# Patient Record
Sex: MALE | Race: WHITE
[De-identification: names, ages, dates, MRNs, and addresses within clinical notes are randomized per-mention and may not be internally consistent; named-entity substitution may affect disease eponyms.]

---

## 2017-11-22 ENCOUNTER — HOSPITAL ENCOUNTER (OUTPATIENT)
Dept: HOSPITAL 47 - LABWHC1 | Age: 52
Discharge: HOME | End: 2017-11-22
Payer: COMMERCIAL

## 2017-11-22 DIAGNOSIS — H93.19: ICD-10-CM

## 2017-11-22 DIAGNOSIS — M79.1: Primary | ICD-10-CM

## 2017-11-22 DIAGNOSIS — R53.83: ICD-10-CM

## 2017-11-22 DIAGNOSIS — A69.22: ICD-10-CM

## 2017-11-22 LAB
ALP SERPL-CCNC: 50 U/L (ref 38–126)
ALT SERPL-CCNC: 45 U/L (ref 21–72)
ANA W/REFLEX TO TITER: NEGATIVE
ANION GAP SERPL CALC-SCNC: 12 MMOL/L
AST SERPL-CCNC: 26 U/L (ref 17–59)
BASOPHILS # BLD AUTO: 0.1 K/UL (ref 0–0.2)
BASOPHILS NFR BLD AUTO: 1 %
BUN SERPL-SCNC: 17 MG/DL (ref 9–20)
CALCIUM SPEC-MCNC: 9.8 MG/DL (ref 8.4–10.2)
CH: 30.7
CHCM: 33.3
CHLORIDE SERPL-SCNC: 103 MMOL/L (ref 98–107)
CO2 SERPL-SCNC: 26 MMOL/L (ref 22–30)
EOSINOPHIL # BLD AUTO: 0.3 K/UL (ref 0–0.7)
EOSINOPHIL NFR BLD AUTO: 4 %
ERYTHROCYTE [DISTWIDTH] IN BLOOD BY AUTOMATED COUNT: 5.21 M/UL (ref 4.3–5.9)
ERYTHROCYTE [DISTWIDTH] IN BLOOD: 13.8 % (ref 11.5–15.5)
GLUCOSE SERPL-MCNC: 107 MG/DL (ref 74–99)
HCT VFR BLD AUTO: 48.2 % (ref 39–53)
HDW: 2.37
HGB BLD-MCNC: 15.5 GM/DL (ref 13–17.5)
LUC NFR BLD AUTO: 2 %
LYMPHOCYTES # SPEC AUTO: 1.9 K/UL (ref 1–4.8)
LYMPHOCYTES NFR SPEC AUTO: 26 %
MAGNESIUM SPEC-SCNC: 1.9 MG/DL (ref 1.6–2.3)
MCH RBC QN AUTO: 29.8 PG (ref 25–35)
MCHC RBC AUTO-ENTMCNC: 32.2 G/DL (ref 31–37)
MCV RBC AUTO: 92.6 FL (ref 80–100)
MONOCYTES # BLD AUTO: 0.3 K/UL (ref 0–1)
MONOCYTES NFR BLD AUTO: 5 %
NEUTROPHILS # BLD AUTO: 4.5 K/UL (ref 1.3–7.7)
NEUTROPHILS NFR BLD AUTO: 63 %
NON-AFRICAN AMERICAN GFR(MDRD): >60
POTASSIUM SERPL-SCNC: 4.2 MMOL/L (ref 3.5–5.1)
PROT SERPL-MCNC: 7.6 G/DL (ref 6.3–8.2)
RHEUMATOID FACT SERPL-ACNC: <9 IU/ML (ref ?–12)
SODIUM SERPL-SCNC: 141 MMOL/L (ref 137–145)
URATE SERPL-MCNC: 5.4 MG/DL (ref 3.5–8.5)
WBC # BLD AUTO: 0.11 10*3/UL
WBC # BLD AUTO: 7.2 K/UL (ref 3.8–10.6)
WBC (PEROX): 6.72

## 2017-11-22 PROCEDURE — 86812 HLA TYPING A B OR C: CPT

## 2017-11-22 PROCEDURE — 83735 ASSAY OF MAGNESIUM: CPT

## 2017-11-22 PROCEDURE — 36415 COLL VENOUS BLD VENIPUNCTURE: CPT

## 2017-11-22 PROCEDURE — 87338 HPYLORI STOOL AG IA: CPT

## 2017-11-22 PROCEDURE — 84630 ASSAY OF ZINC: CPT

## 2017-11-22 PROCEDURE — 80053 COMPREHEN METABOLIC PANEL: CPT

## 2017-11-22 PROCEDURE — 86060 ANTISTREPTOLYSIN O TITER: CPT

## 2017-11-22 PROCEDURE — 83036 HEMOGLOBIN GLYCOSYLATED A1C: CPT

## 2017-11-22 PROCEDURE — 86738 MYCOPLASMA ANTIBODY: CPT

## 2017-11-22 PROCEDURE — 86618 LYME DISEASE ANTIBODY: CPT

## 2017-11-22 PROCEDURE — 86225 DNA ANTIBODY NATIVE: CPT

## 2017-11-22 PROCEDURE — 85652 RBC SED RATE AUTOMATED: CPT

## 2017-11-22 PROCEDURE — 84550 ASSAY OF BLOOD/URIC ACID: CPT

## 2017-11-22 PROCEDURE — 86003 ALLG SPEC IGE CRUDE XTRC EA: CPT

## 2017-11-22 PROCEDURE — 85025 COMPLETE CBC W/AUTO DIFF WBC: CPT

## 2017-11-22 PROCEDURE — 86431 RHEUMATOID FACTOR QUANT: CPT

## 2017-11-22 PROCEDURE — 82525 ASSAY OF COPPER: CPT

## 2017-11-22 PROCEDURE — 86038 ANTINUCLEAR ANTIBODIES: CPT

## 2017-11-23 LAB
B BURGDOR IGG SERPL QL IA: 0.1 INDEX
HLA B27 COMMENT: (no result)
LYME IGG/IGM INTERP: NEGATIVE

## 2017-11-24 LAB
C ALBICANS IGE RAST: (no result)
GLUTEN IGE RAST: (no result)
M PNEUMO IGG SER IA-ACNC: 1.6 INDEX (ref ?–0.9)
M PNEUMO IGM SER QL IA: 0.18 INDEX (ref ?–0.9)

## 2018-01-21 ENCOUNTER — HOSPITAL ENCOUNTER (EMERGENCY)
Dept: HOSPITAL 47 - EC | Age: 53
Discharge: LEFT BEFORE DISCHARGE | End: 2018-01-21
Payer: COMMERCIAL

## 2018-01-21 VITALS — TEMPERATURE: 97.4 F | RESPIRATION RATE: 18 BRPM

## 2018-01-21 VITALS — SYSTOLIC BLOOD PRESSURE: 128 MMHG | DIASTOLIC BLOOD PRESSURE: 77 MMHG | HEART RATE: 61 BPM

## 2018-01-21 DIAGNOSIS — Z88.8: ICD-10-CM

## 2018-01-21 DIAGNOSIS — Z79.899: ICD-10-CM

## 2018-01-21 DIAGNOSIS — I20.9: Primary | ICD-10-CM

## 2018-01-21 LAB
ALBUMIN SERPL-MCNC: 4.1 G/DL (ref 3.5–5)
ALP SERPL-CCNC: 47 U/L (ref 38–126)
ALT SERPL-CCNC: 54 U/L (ref 21–72)
ANION GAP SERPL CALC-SCNC: 11 MMOL/L
APTT BLD: 24 SEC (ref 22–30)
AST SERPL-CCNC: 32 U/L (ref 17–59)
BASOPHILS # BLD AUTO: 0.1 K/UL (ref 0–0.2)
BASOPHILS NFR BLD AUTO: 1 %
BUN SERPL-SCNC: 20 MG/DL (ref 9–20)
CALCIUM SPEC-MCNC: 9.5 MG/DL (ref 8.4–10.2)
CHLORIDE SERPL-SCNC: 105 MMOL/L (ref 98–107)
CK SERPL-CCNC: 94 U/L (ref 55–170)
CO2 SERPL-SCNC: 27 MMOL/L (ref 22–30)
D DIMER PPP FEU-MCNC: 0.22 MG/L FEU (ref ?–0.6)
EOSINOPHIL # BLD AUTO: 0.4 K/UL (ref 0–0.7)
EOSINOPHIL NFR BLD AUTO: 6 %
ERYTHROCYTE [DISTWIDTH] IN BLOOD BY AUTOMATED COUNT: 4.87 M/UL (ref 4.3–5.9)
ERYTHROCYTE [DISTWIDTH] IN BLOOD: 12.6 % (ref 11.5–15.5)
GLUCOSE SERPL-MCNC: 100 MG/DL (ref 74–99)
HCT VFR BLD AUTO: 44.1 % (ref 39–53)
HGB BLD-MCNC: 14.7 GM/DL (ref 13–17.5)
INR PPP: 1.1 (ref ?–1.2)
LYMPHOCYTES # SPEC AUTO: 2.4 K/UL (ref 1–4.8)
LYMPHOCYTES NFR SPEC AUTO: 35 %
MAGNESIUM SPEC-SCNC: 2.1 MG/DL (ref 1.6–2.3)
MCH RBC QN AUTO: 30.1 PG (ref 25–35)
MCHC RBC AUTO-ENTMCNC: 33.3 G/DL (ref 31–37)
MCV RBC AUTO: 90.5 FL (ref 80–100)
MONOCYTES # BLD AUTO: 0.5 K/UL (ref 0–1)
MONOCYTES NFR BLD AUTO: 7 %
NEUTROPHILS # BLD AUTO: 3.3 K/UL (ref 1.3–7.7)
NEUTROPHILS NFR BLD AUTO: 49 %
PLATELET # BLD AUTO: 256 K/UL (ref 150–450)
POTASSIUM SERPL-SCNC: 4 MMOL/L (ref 3.5–5.1)
PROT SERPL-MCNC: 7 G/DL (ref 6.3–8.2)
PT BLD: 10.4 SEC (ref 9–12)
SODIUM SERPL-SCNC: 143 MMOL/L (ref 137–145)
TROPONIN I SERPL-MCNC: <0.012 NG/ML (ref 0–0.03)
WBC # BLD AUTO: 6.8 K/UL (ref 3.8–10.6)

## 2018-01-21 PROCEDURE — 85730 THROMBOPLASTIN TIME PARTIAL: CPT

## 2018-01-21 PROCEDURE — 99285 EMERGENCY DEPT VISIT HI MDM: CPT

## 2018-01-21 PROCEDURE — 85610 PROTHROMBIN TIME: CPT

## 2018-01-21 PROCEDURE — 83735 ASSAY OF MAGNESIUM: CPT

## 2018-01-21 PROCEDURE — 80053 COMPREHEN METABOLIC PANEL: CPT

## 2018-01-21 PROCEDURE — 85025 COMPLETE CBC W/AUTO DIFF WBC: CPT

## 2018-01-21 PROCEDURE — 82553 CREATINE MB FRACTION: CPT

## 2018-01-21 PROCEDURE — 36415 COLL VENOUS BLD VENIPUNCTURE: CPT

## 2018-01-21 PROCEDURE — 82550 ASSAY OF CK (CPK): CPT

## 2018-01-21 PROCEDURE — 84484 ASSAY OF TROPONIN QUANT: CPT

## 2018-01-21 PROCEDURE — 85379 FIBRIN DEGRADATION QUANT: CPT

## 2018-01-21 PROCEDURE — 71046 X-RAY EXAM CHEST 2 VIEWS: CPT

## 2018-01-21 PROCEDURE — 83880 ASSAY OF NATRIURETIC PEPTIDE: CPT

## 2018-01-21 NOTE — ED
General Adult HPI





- General


Chief complaint: Chest Pain


Stated complaint: Chest/Shoulder Pain


Time Seen by Provider: 01/21/18 00:45


Source: patient, RN notes reviewed


Mode of arrival: ambulatory


Limitations: no limitations





- History of Present Illness


Initial comments: 





This is a 52-year-old male who presents emergency Department complaining of 

left shoulder and neck pain.  Patient states the pain radiates down his arm 

towards his elbow.  Patient states it's an ache that at times gets worse but at 

other times it feels better but it is always been there over the last few 

hours.  Patient denies any shortness of breath or diaphoresis.  Patient does 

state he is nauseated with this pain.  Patient states it does not feel muscular 

he has had quite a few problems with muscular pain in the past and even pinch 

nerves and this does not feel similar.  He denies any recent injury denies any 

heavy lifting.  Patient denies any recent fever chills or cough.  Patient 

denies any abdominal pain.  Patient states the pain is not worsened with any 

movement.  Patient also states he has a strong family history.  He denies any 

medical problems at all





- Related Data


 Home Medications











 Medication  Instructions  Recorded  Confirmed


 


Terbinafine [LamISIL] 250 mg PO DAILY 04/12/16 06/02/16


 


Amoxicillin/Potassium Clav 1 each PO QAM 05/26/16 06/02/16





[Augmentin 875-125 Tablet]   


 


Multivitamins, Thera [Multivitamin] 1 tab PO DAILY 05/26/16 06/02/16








 Previous Rx's











 Medication  Instructions  Recorded


 


Amoxicillin/Potassium Clav 1 each PO Q12HR #20 tab 06/02/16





[Augmentin 875-125 Tablet]  


 


Hydrocodone/Acetaminophen [Norco 1 - 2 each PO Q6HR PRN #40 tab 06/02/16





5-325]  











 Allergies











Allergy/AdvReac Type Severity Reaction Status Date / Time


 


steroids AdvReac  mood swings Uncoded 01/21/18 00:45














Review of Systems


ROS Statement: 


Those systems with pertinent positive or pertinent negative responses have been 

documented in the HPI.





ROS Other: All systems not noted in ROS Statement are negative.





Past Medical History


Past Medical History: No Reported History


Additional Past Medical History / Comment(s): sinus problems


History of Any Multi-Drug Resistant Organisms: None Reported


Additional Past Surgical History / Comment(s): vasectomy, sinus surgery


Past Anesthesia/Blood Transfusion Reactions: No Reported Reaction


Past Psychological History: No Psychological Hx Reported


Smoking Status: Never smoker





- Past Family History


  ** Mother


Family Medical History: No Reported History





General Exam





- General Exam Comments


Initial Comments: 





GENERAL:


Patient is well-developed and well-nourished.  Patient is nontoxic and well-

hydrated and is in mild distress.





ENT:


Neck is soft and supple.  No significant lymphadenopathy is noted.  Oropharynx 

is clear.  Moist mucous membranes.  Neck has full range of motion without 

eliciting any pain.  





EYES:


The sclera were anicteric and conjunctiva were pink and moist.  Extraocular 

movements were intact and pupils were equal round and reactive to light.  

Eyelids were unremarkable.





PULMONARY:


Unlabored respirations.  Good breath sounds bilaterally.  No audible rales 

rhonchi or wheezing was noted.





CARDIOVASCULAR:


There is a regular rate and rhythm without any murmurs gallops or rubs.  





ABDOMEN:


Soft and nontender with normal bowel sounds.  No palpable organomegaly was 

noted.  There is no palpable pulsatile mass.





SKIN:


Skin is clear with no lesions or rashes and otherwise unremarkable.





NEUROLOGIC:


Patient is alert and oriented x3.  Cranial nerves II through XII are grossly 

intact.  Motor and sensory are also intact.  Normal speech, volume and content.

  Symmetrical smile. 





MUSCULOSKELETAL:


Normal extremities with adequate strength and full range of motion.  





LYMPHATICS:


No significant lymphadenopathy is noted





PSYCHIATRIC:


Normal psychiatric evaluation.  Normal interpersonal interactions appears 

functionally intact in deals appropriately with others.  No signs of 

depression.  No signs of anxiety. 


Limitations: no limitations





Course


 Vital Signs











  01/21/18 01/21/18





  00:41 02:03


 


Temperature 97.4 F L 


 


Pulse Rate 66 61


 


Respiratory 18 18





Rate  


 


Blood Pressure 169/81 128/77


 


O2 Sat by Pulse 100 100





Oximetry  














Medical Decision Making





- Medical Decision Making





EKG shows normal sinus rhythm at 60 bpm NV interval is 160 QRS is 104 QT 

interval 4:30 QTC is 4:30.  Patient's EKG shows no ST segment elevation or 

depression





I will begin to reevaluate the patient patient was still having left-sided 

shoulder pain.  I told the patient I thought he should be admitted to the 

hospital patient refused to be admitted because he didn't want have to pay for 

a pill.  Patient signed out AGAINST MEDICAL ADVICE understanding that it still 

could be his heart and he can go home and have significant symptoms and damage 

to his heart.





- Lab Data


Result diagrams: 


 01/21/18 01:20





 01/21/18 01:20


 Lab Results











  01/21/18 01/21/18 01/21/18 Range/Units





  01:20 01:20 01:20 


 


WBC   6.8   (3.8-10.6)  k/uL


 


RBC   4.87   (4.30-5.90)  m/uL


 


Hgb   14.7   (13.0-17.5)  gm/dL


 


Hct   44.1   (39.0-53.0)  %


 


MCV   90.5   (80.0-100.0)  fL


 


MCH   30.1   (25.0-35.0)  pg


 


MCHC   33.3   (31.0-37.0)  g/dL


 


RDW   12.6   (11.5-15.5)  %


 


Plt Count   256   (150-450)  k/uL


 


Neutrophils %   49   %


 


Lymphocytes %   35   %


 


Monocytes %   7   %


 


Eosinophils %   6   %


 


Basophils %   1   %


 


Neutrophils #   3.3   (1.3-7.7)  k/uL


 


Lymphocytes #   2.4   (1.0-4.8)  k/uL


 


Monocytes #   0.5   (0-1.0)  k/uL


 


Eosinophils #   0.4   (0-0.7)  k/uL


 


Basophils #   0.1   (0-0.2)  k/uL


 


PT     (9.0-12.0)  sec


 


INR     (<1.2)  


 


APTT     (22.0-30.0)  sec


 


D-Dimer     (<0.60)  mg/L FEU


 


Sodium    143  (137-145)  mmol/L


 


Potassium    4.0  (3.5-5.1)  mmol/L


 


Chloride    105  ()  mmol/L


 


Carbon Dioxide    27  (22-30)  mmol/L


 


Anion Gap    11  mmol/L


 


BUN    20  (9-20)  mg/dL


 


Creatinine    1.10  (0.66-1.25)  mg/dL


 


Est GFR (MDRD) Af Amer    >60  (>60 ml/min/1.73 sqM)  


 


Est GFR (MDRD) Non-Af    >60  (>60 ml/min/1.73 sqM)  


 


Glucose    100 H  (74-99)  mg/dL


 


Calcium    9.5  (8.4-10.2)  mg/dL


 


Magnesium    2.1  (1.6-2.3)  mg/dL


 


Total Bilirubin    0.3  (0.2-1.3)  mg/dL


 


AST    32  (17-59)  U/L


 


ALT    54  (21-72)  U/L


 


Alkaline Phosphatase    47  ()  U/L


 


Total Creatine Kinase  94    ()  U/L


 


CK-MB (CK-2)  0.7    (0.0-2.4)  ng/mL


 


CK-MB (CK-2) Rel Index  0.7    


 


Troponin I  <0.012    (0.000-0.034)  ng/mL


 


NT-Pro-B Natriuret Pep     pg/mL


 


Total Protein    7.0  (6.3-8.2)  g/dL


 


Albumin    4.1  (3.5-5.0)  g/dL














  01/21/18 01/21/18 Range/Units





  01:20 01:20 


 


WBC    (3.8-10.6)  k/uL


 


RBC    (4.30-5.90)  m/uL


 


Hgb    (13.0-17.5)  gm/dL


 


Hct    (39.0-53.0)  %


 


MCV    (80.0-100.0)  fL


 


MCH    (25.0-35.0)  pg


 


MCHC    (31.0-37.0)  g/dL


 


RDW    (11.5-15.5)  %


 


Plt Count    (150-450)  k/uL


 


Neutrophils %    %


 


Lymphocytes %    %


 


Monocytes %    %


 


Eosinophils %    %


 


Basophils %    %


 


Neutrophils #    (1.3-7.7)  k/uL


 


Lymphocytes #    (1.0-4.8)  k/uL


 


Monocytes #    (0-1.0)  k/uL


 


Eosinophils #    (0-0.7)  k/uL


 


Basophils #    (0-0.2)  k/uL


 


PT  10.4   (9.0-12.0)  sec


 


INR  1.1   (<1.2)  


 


APTT  24.0   (22.0-30.0)  sec


 


D-Dimer  0.22   (<0.60)  mg/L FEU


 


Sodium    (137-145)  mmol/L


 


Potassium    (3.5-5.1)  mmol/L


 


Chloride    ()  mmol/L


 


Carbon Dioxide    (22-30)  mmol/L


 


Anion Gap    mmol/L


 


BUN    (9-20)  mg/dL


 


Creatinine    (0.66-1.25)  mg/dL


 


Est GFR (MDRD) Af Amer    (>60 ml/min/1.73 sqM)  


 


Est GFR (MDRD) Non-Af    (>60 ml/min/1.73 sqM)  


 


Glucose    (74-99)  mg/dL


 


Calcium    (8.4-10.2)  mg/dL


 


Magnesium    (1.6-2.3)  mg/dL


 


Total Bilirubin    (0.2-1.3)  mg/dL


 


AST    (17-59)  U/L


 


ALT    (21-72)  U/L


 


Alkaline Phosphatase    ()  U/L


 


Total Creatine Kinase    ()  U/L


 


CK-MB (CK-2)    (0.0-2.4)  ng/mL


 


CK-MB (CK-2) Rel Index    


 


Troponin I    (0.000-0.034)  ng/mL


 


NT-Pro-B Natriuret Pep   38  pg/mL


 


Total Protein    (6.3-8.2)  g/dL


 


Albumin    (3.5-5.0)  g/dL














Disposition


Clinical Impression: 


 Atypical angina





Disposition: Left Against Medical Advice


Referrals: 


Adam Milton DO [Primary Care Provider] - 1-2 days


Time of Disposition: 03:07

## 2018-01-21 NOTE — XR
EXAMINATION TYPE: XR chest 2V

 

DATE OF EXAM: 1/21/2018

 

COMPARISON: NONE

 

HISTORY: Chest pain

 

TECHNIQUE:  Frontal and lateral views of the chest are obtained.

 

FINDINGS:  Heart and mediastinum are normal. Lungs are clear. Diaphragm is normal. There is some mild
 calcified pleural plaque on the left lateral chest wall. There are chest leads. There is no pleural 
effusion.

 

IMPRESSION:  No active cardiopulmonary disease. Mild left-sided pleural calcification. Normal heart.